# Patient Record
Sex: FEMALE | Race: WHITE | Employment: OTHER | URBAN - NONMETROPOLITAN AREA
[De-identification: names, ages, dates, MRNs, and addresses within clinical notes are randomized per-mention and may not be internally consistent; named-entity substitution may affect disease eponyms.]

---

## 2022-04-13 ENCOUNTER — TELEPHONE (OUTPATIENT)
Dept: UROLOGY | Age: 87
End: 2022-04-13

## 2022-04-13 RX ORDER — GABAPENTIN 300 MG/1
CAPSULE ORAL
COMMUNITY
Start: 2022-04-11

## 2022-04-13 RX ORDER — NITROFURANTOIN 25; 75 MG/1; MG/1
CAPSULE ORAL
COMMUNITY
Start: 2022-03-28 | End: 2022-06-01

## 2022-04-13 RX ORDER — NITROGLYCERIN 0.4 MG/1
0.4 TABLET SUBLINGUAL EVERY 5 MIN PRN
COMMUNITY

## 2022-04-13 RX ORDER — ESCITALOPRAM OXALATE 5 MG/1
TABLET ORAL
COMMUNITY
Start: 2022-03-26

## 2022-04-13 RX ORDER — MULTIVIT-MIN/IRON/FOLIC ACID/K 18-600-40
CAPSULE ORAL
COMMUNITY
Start: 2022-03-19

## 2022-04-13 RX ORDER — OMEPRAZOLE 20 MG/1
20 CAPSULE, DELAYED RELEASE ORAL DAILY
COMMUNITY

## 2022-04-13 NOTE — TELEPHONE ENCOUNTER
Received referral to [facility] Urology from Dr. Mena Sutton office for recurrent UTI. Attempted to contact patient to schedule, no answer and no voicemail set up.

## 2022-06-01 ENCOUNTER — OFFICE VISIT (OUTPATIENT)
Dept: UROLOGY | Age: 87
End: 2022-06-01
Payer: MEDICARE

## 2022-06-01 VITALS
HEART RATE: 89 BPM | RESPIRATION RATE: 16 BRPM | BODY MASS INDEX: 26.9 KG/M2 | OXYGEN SATURATION: 97 % | HEIGHT: 60 IN | WEIGHT: 137 LBS | SYSTOLIC BLOOD PRESSURE: 136 MMHG | DIASTOLIC BLOOD PRESSURE: 86 MMHG

## 2022-06-01 DIAGNOSIS — N39.0 RECURRENT UTI: Primary | ICD-10-CM

## 2022-06-01 DIAGNOSIS — R33.9 INCOMPLETE EMPTYING OF BLADDER: ICD-10-CM

## 2022-06-01 PROCEDURE — 99203 OFFICE O/P NEW LOW 30 MIN: CPT | Performed by: UROLOGY

## 2022-06-01 PROCEDURE — 1123F ACP DISCUSS/DSCN MKR DOCD: CPT | Performed by: UROLOGY

## 2022-06-01 NOTE — PROGRESS NOTES
Dr. Elton Dixon MD MD  [facility] Clinic Consultation / New Patient Visit    Patient:  Kelly Griffith  YOB: 1928  Date: 6/1/2022  Consult requested from Shauna Butler MD     HISTORY OF PRESENT ILLNESS:   The patient is a 80 y.o. female who presents today for follow-up for the following problem(s): urinary retention  Overall the problem(s) : are worsening. Associated Symptoms: No dysuria, gross hematuria. Pain Severity:      Today visit:   6/1/22  Presents with history of recurrent UTI. Which has been improved since getting a bidet. She has history of difficulty emptying and urinary retention. She has urge incontinence, which is mild. PPD: 0 ml. Summary of old records:   (Patient's old records, notes and chart reviewed and summarized above.)    Urinalysis today:  No results found for this visit on 06/01/22. Last BUN and creatinine:  No results found for: BUN  No results found for: CREATININE    Imaging Reviewed during this Office Visit:   (results were independently reviewed by physician and radiology report verified)    PAST MEDICAL, FAMILY AND SOCIAL HISTORY:  History reviewed. No pertinent past medical history. History reviewed. No pertinent surgical history. History reviewed. No pertinent family history.   Outpatient Medications Marked as Taking for the 6/1/22 encounter (Office Visit) with Trena Clarke MD   Medication Sig Dispense Refill    escitalopram (LEXAPRO) 5 MG tablet TAKE 1 TABLET BY MOUTH EVERY OTHER DAY FOR DYSTHYMIA      Vitamin D, Cholecalciferol, 25 MCG (1000 UT) TABS TAKE 1 TABLET BY MOUTH ONCE DAILY      gabapentin (NEURONTIN) 300 MG capsule       Multiple Vitamins-Minerals (EYE VITAMINS PO) Take 1 capsule by mouth daily      omeprazole (PRILOSEC) 20 MG delayed release capsule Take 20 mg by mouth daily      Acetaminophen (TYLENOL PO) Take 500 mg by mouth 3 times daily as needed (fever, discomfort)      Calcium Carbonate Antacid

## 2022-06-01 NOTE — PATIENT INSTRUCTIONS
If you need to reach the Urologist or Urology Nurses for any health care questions or concerns please call [phone] and ask to speak with the [facility]. The phone line is open from 8a-430p Monday thru Friday. Letter sent

## 2024-01-23 RX ORDER — RIVASTIGMINE TARTRATE 1.5 MG/1
1.5 CAPSULE ORAL 2 TIMES DAILY
COMMUNITY

## 2024-01-23 RX ORDER — BISACODYL 5 MG/1
10 TABLET, DELAYED RELEASE ORAL DAILY PRN
COMMUNITY

## 2024-01-23 RX ORDER — MORPHINE SULFATE 100 MG/5ML
0.25 SOLUTION ORAL
COMMUNITY

## 2024-01-23 RX ORDER — ONDANSETRON 4 MG/1
4 TABLET, FILM COATED ORAL EVERY 4 HOURS PRN
COMMUNITY

## 2024-01-23 RX ORDER — ASPIRIN 81 MG
5 TABLET, DELAYED RELEASE (ENTERIC COATED) ORAL PRN
COMMUNITY

## 2024-01-23 RX ORDER — BISACODYL 10 MG
10 SUPPOSITORY, RECTAL RECTAL DAILY PRN
COMMUNITY

## 2024-01-23 NOTE — PROGRESS NOTES
Received med list from [facility] with pt's current med list. Meds updated in Epic.

## 2024-01-26 ENCOUNTER — OUTSIDE SERVICES (OUTPATIENT)
Dept: FAMILY MEDICINE CLINIC | Age: 89
End: 2024-01-26
Payer: MEDICARE

## 2024-01-26 DIAGNOSIS — E78.5 HYPERLIPIDEMIA, UNSPECIFIED HYPERLIPIDEMIA TYPE: ICD-10-CM

## 2024-01-26 DIAGNOSIS — M40.209 KYPHOSIS, UNSPECIFIED KYPHOSIS TYPE, UNSPECIFIED SPINAL REGION: ICD-10-CM

## 2024-01-26 DIAGNOSIS — F34.1 DYSTHYMIC DISORDER: ICD-10-CM

## 2024-01-26 DIAGNOSIS — Z91.81 AT RISK FOR FALLS: ICD-10-CM

## 2024-01-26 DIAGNOSIS — Z86.69 HISTORY OF POLYNEUROPATHY: ICD-10-CM

## 2024-01-26 DIAGNOSIS — R10.13 DYSPEPSIA: ICD-10-CM

## 2024-01-26 DIAGNOSIS — U07.1 COVID-19: ICD-10-CM

## 2024-01-26 DIAGNOSIS — G95.9 MYELOPATHY (HCC): ICD-10-CM

## 2024-01-26 DIAGNOSIS — Z87.39: ICD-10-CM

## 2024-01-26 DIAGNOSIS — R53.1 GENERALIZED WEAKNESS: ICD-10-CM

## 2024-01-26 DIAGNOSIS — G89.4 CHRONIC PAIN SYNDROME: ICD-10-CM

## 2024-01-26 DIAGNOSIS — M47.9 SPONDYLOSIS: ICD-10-CM

## 2024-01-26 DIAGNOSIS — Z86.39 HISTORY OF OBESITY: ICD-10-CM

## 2024-01-26 DIAGNOSIS — N39.0 URINARY TRACT INFECTION WITHOUT HEMATURIA, SITE UNSPECIFIED: Primary | ICD-10-CM

## 2024-01-26 DIAGNOSIS — M81.0 AGE RELATED OSTEOPOROSIS, UNSPECIFIED PATHOLOGICAL FRACTURE PRESENCE: ICD-10-CM

## 2024-01-26 DIAGNOSIS — Z91.81 HISTORY OF FALL: ICD-10-CM

## 2024-01-26 PROCEDURE — 99305 1ST NF CARE MODERATE MDM 35: CPT | Performed by: FAMILY MEDICINE

## 2024-02-01 LAB
ANION GAP SERPL CALCULATED.3IONS-SCNC: 4.2 MMOL/L (ref 3–11)
BANDED NEUTROPHILS RELATIVE PERCENT: 0 % (ref 0–5)
BASOPHILS %. MANUAL COUNT: 1 % (ref 0–1)
BUN BLDV-MCNC: 18 MG/DL (ref 7–17)
CALCIUM SERPL-MCNC: 9 MG/DL (ref 8.4–10.2)
CHLORIDE BLD-SCNC: 106 MMOL/L (ref 98–120)
CO2: 31 MMOL/L (ref 22–31)
CREAT SERPL-MCNC: 0.7 MG/DL (ref 0.5–1)
EOSINOPHILS % MANUAL COUNT: 1 (ref 0–10)
GFR CALCULATED: > 60
GLUCOSE: 97 MG/DL (ref 65–105)
HCT VFR BLD CALC: 30.6 % (ref 37–47)
HEMOGLOBIN: 9.6 (ref 12–16)
HYPOCHROMIA: ABNORMAL
LYMPHOCYTES % MANUAL COUNT: 21 % (ref 21–51)
MCH RBC QN AUTO: 28.8 PG (ref 28.5–32.5)
MCHC RBC AUTO-ENTMCNC: 31.5 G/DL (ref 32–37)
MCV RBC AUTO: 91.3 FL (ref 80–94)
MONOCYTES RELATIVE PERCENT: 11 % (ref 2–9)
NEUTROPHILS %. MANUAL COUNT: 66 % (ref 42–75)
PDW BLD-RTO: 13.3 % (ref 8.5–15.5)
PLATELET # BLD: 307 THOU/MM3 (ref 130–400)
POTASSIUM SERPL-SCNC: 3.2 MMOL/L (ref 3.6–5)
RBC: 3.35 M/UL (ref 4.2–5.4)
SEDIMENTATION RATE, ERYTHROCYTE: 89 MM/HR (ref 0–20)
SODIUM BLD-SCNC: 138 MMOL/L (ref 135–145)
URIC ACID: 2.9 MG/DL (ref 3.5–8.5)
WBC: 11.7 THOU/ML3 (ref 4.8–10.8)

## 2024-02-05 RX ORDER — DOCUSATE SODIUM 100 MG/1
100 CAPSULE, LIQUID FILLED ORAL DAILY
COMMUNITY

## 2024-02-07 ENCOUNTER — TELEPHONE (OUTPATIENT)
Dept: FAMILY MEDICINE CLINIC | Age: 89
End: 2024-02-07

## 2024-02-07 RX ORDER — ACETAMINOPHEN 650 MG/1
650 SUPPOSITORY RECTAL EVERY 6 HOURS PRN
COMMUNITY

## 2024-02-07 NOTE — TELEPHONE ENCOUNTER
Nurse Suzanne LOPEZ called stating pt - Not really responding except to look at you then closes eyes, temp 100.8 and 101.8, family would like a UA order. BP - 172/73 HR - 94 O2 - 93% Room Air. Pt has an bmp in the morning can you add a UA? Void has been decreased. Pt son said this is how she gets when she has a UTI. Pt not taking Tylenol. Can pt get an order for a suppository for temp control. Will continue put cool rags on pt to help with temp.

## 2024-02-08 LAB
ANION GAP SERPL CALCULATED.3IONS-SCNC: 3.5 MMOL/L (ref 3–11)
BUN BLDV-MCNC: 21 MG/DL (ref 7–17)
CALCIUM SERPL-MCNC: 9 MG/DL (ref 8.4–10.2)
CHLORIDE BLD-SCNC: 105 MMOL/L (ref 98–120)
CO2: 33 MMOL/L (ref 22–31)
CREAT SERPL-MCNC: 0.7 MG/DL (ref 0.5–1)
GFR CALCULATED: > 60
GLUCOSE: 104 MG/DL (ref 65–105)
POTASSIUM SERPL-SCNC: 3.5 MMOL/L (ref 3.6–5)
SODIUM BLD-SCNC: 138 MMOL/L (ref 135–145)

## 2024-02-10 LAB
BACTERIA, URINE: ABNORMAL /HPF
BILIRUBIN URINE: NEGATIVE
BLOOD, URINE: ABNORMAL
CASTS UA: ABNORMAL /LPF
CLARITY: ABNORMAL
COLOR, URINE: YELLOW
CRYSTALS, UA: ABNORMAL
GLUCOSE URINE: NEGATIVE MG/DL
KETONES, URINE: NEGATIVE MG/DL
LEUKOCYTE ESTERASE, URINE: NEGATIVE
NITRITE, URINE: NEGATIVE
PH UA: 8 (ref 5–8.5)
PROTEIN UA: ABNORMAL MG/DL
RBC URINE: ABNORMAL /HPF (ref 0–2)
SPECIFIC GRAVITY, URINE: 1.01 MG/DL (ref 1–1.03)
SQUAMOUS EPITHELIAL: ABNORMAL /HPF
UROBILINOGEN, URINE: 0.2 MG/DL (ref 0.2–1)
WBC URINE: ABNORMAL /HPF (ref 0–4)

## 2024-02-19 NOTE — PROGRESS NOTES
[facility]  Date of Service:  1/26/2024  Sameera Clark  Date of Birth 12/12/1928  MRN: [id number]  Code Status----FULL CODE      HPI:  This is just a former assisted living patient followed by Dr. Washington in [place] who was recently admitted to the [facility] January 7, 2024 through January 10, 2024 for acute weakness and some disorientation. She was briefly housed at [facility] before being admitted to [facility] as they had no available bed at the time of her discharge. She has some underlying dementia and she had acute confusion and weakness, complicated by COVID infection and urinary tract infection at the time. She was hydrated and treated with Keflex for the urinary tract infection. Cognition has improved but she is still not back to her baseline. Seems to have some global weakness. She is working with physical therapy at present. Anticipate she will likely be permanent placement on the nursing home side at this point due to progressing dementia, some global weakness, and advanced age. She had a CT scan of the abdomen during her hospitalization with no acute pathology. Mental status improved with the treatment of the urinary tract infection with Keflex. She is still not back to her baseline as far as overall strength and performance.         Immunization History   Administered Date(s) Administered    COVID-19, MODERNA BLUE border, Primary or Immunocompromised, (age 12y+), IM, 100 mcg/0.5mL 01/19/2021, 02/19/2021, 11/18/2021    DTP 11/17/2011    Hep A, HAVRIX, VAQTA, (age 19y+), IM, 1mL 07/12/2018, 03/11/2019    Influenza Virus Vaccine 10/11/2016, 09/12/2017    Influenza Whole 10/12/2007, 10/14/2008, 08/31/2011    Influenza, FLUBLOK, (age 18 y+), PF, 0.5mL 10/18/2019    Influenza, FLUZONE (age 65 y+), High Dose, 0.7mL 10/02/2021    Influenza, High Dose (Fluzone 65 yrs and older) 10/13/2018, 09/16/2020    Pneumococcal, PCV-13, PREVNAR 13, (age 6w+), IM, 0.5mL

## 2024-02-27 ENCOUNTER — OUTSIDE SERVICES (OUTPATIENT)
Dept: INTERNAL MEDICINE | Age: 89
End: 2024-02-27
Payer: MEDICARE

## 2024-02-27 DIAGNOSIS — F03.B0 MODERATE DEMENTIA, UNSPECIFIED DEMENTIA TYPE, UNSPECIFIED WHETHER BEHAVIORAL, PSYCHOTIC, OR MOOD DISTURBANCE OR ANXIETY (HCC): Primary | ICD-10-CM

## 2024-02-27 DIAGNOSIS — F41.1 GENERALIZED ANXIETY DISORDER: ICD-10-CM

## 2024-02-27 DIAGNOSIS — E78.5 HYPERLIPIDEMIA, UNSPECIFIED HYPERLIPIDEMIA TYPE: ICD-10-CM

## 2024-02-27 DIAGNOSIS — G89.4 CHRONIC PAIN SYNDROME: ICD-10-CM

## 2024-02-27 DIAGNOSIS — M19.011 PRIMARY OSTEOARTHRITIS, RIGHT SHOULDER: ICD-10-CM

## 2024-02-27 DIAGNOSIS — R29.6 RECURRENT FALLS: ICD-10-CM

## 2024-02-27 PROBLEM — R10.13 EPIGASTRIC PAIN: Status: ACTIVE | Noted: 2024-02-27

## 2024-02-27 PROBLEM — Z85.828 PERSONAL HISTORY OF MALIGNANT NEOPLASM OF SKIN: Status: ACTIVE | Noted: 2024-02-27

## 2024-02-27 PROBLEM — M48.061 SPINAL STENOSIS OF LUMBAR REGION: Status: ACTIVE | Noted: 2024-02-27

## 2024-02-27 PROBLEM — E66.9 OBESITY: Status: ACTIVE | Noted: 2024-02-27

## 2024-02-27 PROBLEM — R41.841 COGNITIVE COMMUNICATION DEFICIT: Status: ACTIVE | Noted: 2024-02-27

## 2024-02-27 PROBLEM — N39.0 UTI (URINARY TRACT INFECTION): Status: ACTIVE | Noted: 2024-02-27

## 2024-02-27 PROBLEM — M40.209 KYPHOSIS: Status: ACTIVE | Noted: 2024-02-27

## 2024-02-27 PROBLEM — Z91.81 HISTORY OF FALLING: Status: ACTIVE | Noted: 2024-02-27

## 2024-02-27 PROBLEM — M81.0 OSTEOPOROSIS: Status: ACTIVE | Noted: 2024-02-27

## 2024-02-27 PROBLEM — M13.0 POLYARTHRITIS: Status: ACTIVE | Noted: 2024-02-27

## 2024-02-27 PROBLEM — M41.9 SCOLIOSIS: Status: ACTIVE | Noted: 2024-02-27

## 2024-02-27 PROBLEM — M47.9 SPONDYLOSIS: Status: ACTIVE | Noted: 2024-02-27

## 2024-02-27 PROBLEM — R53.1 GENERALIZED WEAKNESS: Status: ACTIVE | Noted: 2024-02-27

## 2024-02-27 PROBLEM — Z86.16 PERSONAL HISTORY OF COVID-19: Status: ACTIVE | Noted: 2024-02-27

## 2024-02-27 PROBLEM — F34.1 DYSTHYMIC DISORDER: Status: ACTIVE | Noted: 2024-02-27

## 2024-02-27 PROBLEM — R13.10 DYSPHAGIA: Status: ACTIVE | Noted: 2024-02-27

## 2024-02-27 PROBLEM — F03.90 DEMENTIA (HCC): Status: ACTIVE | Noted: 2024-02-27

## 2024-02-27 PROCEDURE — 99308 SBSQ NF CARE LOW MDM 20: CPT | Performed by: NURSE PRACTITIONER

## 2024-02-28 NOTE — PROGRESS NOTES
rivastigmine (EXELON) 1.5 MG capsule Take 1 capsule by mouth 2 times daily      ondansetron (ZOFRAN) 4 MG tablet Take 1 tablet by mouth every 4 hours as needed for Nausea or Vomiting      escitalopram (LEXAPRO) 5 MG tablet Take 2 tablets by mouth daily      Vitamin D, Cholecalciferol, 25 MCG (1000 UT) TABS TAKE 1 TABLET BY MOUTH ONCE DAILY      gabapentin (NEURONTIN) 300 MG capsule Take 1 capsule by mouth 2 times daily.      Multiple Vitamins-Minerals (EYE VITAMINS PO) Take 1 capsule by mouth daily      nitroGLYCERIN (NITROSTAT) 0.4 MG SL tablet Place 1 tablet under the tongue every 5 minutes as needed for Chest pain up to max of 3 total doses. If no relief after 1 dose, call 911.      Acetaminophen (TYLENOL PO) Take 650 mg by mouth every 6 hours as needed (fever, discomfort)       No current facility-administered medications on file prior to visit.       Review of Systems   Reason unable to perform ROS: Little verbal communication.        Physical Exam  Vitals and nursing note reviewed.   Constitutional:       General: She is not in acute distress.     Appearance: She is well-developed. She is not diaphoretic.      Comments: Fatigued, well-groomed   HENT:      Head: Normocephalic and atraumatic.      Right Ear: External ear normal.      Left Ear: External ear normal.   Eyes:      General:         Right eye: No discharge.         Left eye: No discharge.   Neck:      Trachea: No tracheal deviation.   Cardiovascular:      Rate and Rhythm: Normal rate and regular rhythm.      Pulses: Normal pulses.      Heart sounds: Normal heart sounds. No murmur heard.     No friction rub. No gallop.   Pulmonary:      Effort: Pulmonary effort is normal. No respiratory distress.      Breath sounds: Normal breath sounds. No stridor. No wheezing, rhonchi or rales.   Chest:      Chest wall: No tenderness.   Abdominal:      General: Bowel sounds are normal. There is no distension.      Palpations: Abdomen is soft.      Tenderness: There

## 2024-03-28 PROBLEM — N39.0 UTI (URINARY TRACT INFECTION): Status: RESOLVED | Noted: 2024-02-27 | Resolved: 2024-03-28

## 2024-04-23 ENCOUNTER — OUTSIDE SERVICES (OUTPATIENT)
Dept: INTERNAL MEDICINE | Age: 89
End: 2024-04-23
Payer: MEDICARE

## 2024-04-23 DIAGNOSIS — F03.B0 MODERATE DEMENTIA, UNSPECIFIED DEMENTIA TYPE, UNSPECIFIED WHETHER BEHAVIORAL, PSYCHOTIC, OR MOOD DISTURBANCE OR ANXIETY (HCC): Primary | ICD-10-CM

## 2024-04-23 DIAGNOSIS — F41.1 GENERALIZED ANXIETY DISORDER: ICD-10-CM

## 2024-04-23 DIAGNOSIS — E78.5 HYPERLIPIDEMIA, UNSPECIFIED HYPERLIPIDEMIA TYPE: ICD-10-CM

## 2024-04-23 DIAGNOSIS — M13.0 POLYARTHRITIS: ICD-10-CM

## 2024-04-23 DIAGNOSIS — G89.4 CHRONIC PAIN SYNDROME: ICD-10-CM

## 2024-04-23 PROCEDURE — 99308 SBSQ NF CARE LOW MDM 20: CPT | Performed by: NURSE PRACTITIONER

## 2024-04-23 RX ORDER — ESCITALOPRAM OXALATE 10 MG/1
10 TABLET ORAL DAILY
COMMUNITY
Start: 2024-04-01

## 2024-04-23 NOTE — PROGRESS NOTES
[facility]  Date of Service: 04/23/24  Sameera Clark  Date of Birth 12/12/1928  Code Status----DNR CC        HPI:  This is just a former assisted living patient followed by Dr. Washington in [place] who was recently admitted to the [facility] January 7, 2024 through January 10, 2024 for acute weakness and some disorientation. She was briefly housed at [facility] before being admitted to [facility] as they had no available bed at the time of her discharge. She has some underlying dementia and she had acute confusion and weakness, complicated by COVID infection and urinary tract infection at the time. She was hydrated and treated with Keflex for the urinary tract infection. Cognition has improved but she is still not back to her baseline. Seems to have some global weakness. She is working with physical therapy at present. Anticipate she will likely be permanent placement on the nursing home side at this point due to progressing dementia, some global weakness, and advanced age. She had a CT scan of the abdomen during her hospitalization with no acute pathology. Mental status improved with the treatment of the urinary tract infection with Keflex. She is still not back to her baseline as far as overall strength and performance.      In the interim  Patient being seen for routine monthly rounds.  Staff with concerns of intermittent left arm swelling.  Did have ultrasound of the left arm which was negative for DVT.  No acute swelling on exam today.  Patient denies any acute concerns or pain.  Alert to her name only.  She has completed physical therapy and Occupational Therapy.  Has felt to reach max potential.  Vital signs have been stable.  Staff declined any significant behavioral outburst.  Current plan is for patient is long-term care at [facility].  Weight has remained stable.  Continues to follow with psychiatric services.           Immunization History   Administered Date(s)

## 2024-05-31 ENCOUNTER — OUTSIDE SERVICES (OUTPATIENT)
Dept: FAMILY MEDICINE CLINIC | Age: 89
End: 2024-05-31
Payer: MEDICARE

## 2024-05-31 DIAGNOSIS — U07.1 COVID-19: ICD-10-CM

## 2024-05-31 DIAGNOSIS — R10.13 DYSPEPSIA: ICD-10-CM

## 2024-05-31 DIAGNOSIS — Z91.81 AT RISK FOR FALLS: ICD-10-CM

## 2024-05-31 DIAGNOSIS — M40.209 KYPHOSIS, UNSPECIFIED KYPHOSIS TYPE, UNSPECIFIED SPINAL REGION: ICD-10-CM

## 2024-05-31 DIAGNOSIS — R53.1 GENERALIZED WEAKNESS: ICD-10-CM

## 2024-05-31 DIAGNOSIS — Z91.81 HISTORY OF FALL: ICD-10-CM

## 2024-05-31 DIAGNOSIS — G89.4 CHRONIC PAIN SYNDROME: ICD-10-CM

## 2024-05-31 DIAGNOSIS — F34.1 DYSTHYMIC DISORDER: ICD-10-CM

## 2024-05-31 DIAGNOSIS — N39.0 URINARY TRACT INFECTION WITHOUT HEMATURIA, SITE UNSPECIFIED: Primary | ICD-10-CM

## 2024-05-31 DIAGNOSIS — Z87.39: ICD-10-CM

## 2024-05-31 DIAGNOSIS — E78.5 HYPERLIPIDEMIA, UNSPECIFIED HYPERLIPIDEMIA TYPE: ICD-10-CM

## 2024-05-31 DIAGNOSIS — M47.9 SPONDYLOSIS: ICD-10-CM

## 2024-05-31 DIAGNOSIS — M81.0 AGE RELATED OSTEOPOROSIS, UNSPECIFIED PATHOLOGICAL FRACTURE PRESENCE: ICD-10-CM

## 2024-05-31 DIAGNOSIS — Z86.39 HISTORY OF OBESITY: ICD-10-CM

## 2024-05-31 DIAGNOSIS — Z86.69 HISTORY OF POLYNEUROPATHY: ICD-10-CM

## 2024-05-31 DIAGNOSIS — G95.9 MYELOPATHY (HCC): ICD-10-CM

## 2024-05-31 PROCEDURE — 99309 SBSQ NF CARE MODERATE MDM 30: CPT | Performed by: FAMILY MEDICINE

## 2024-06-14 LAB
BACTERIA, URINE: ABNORMAL /HPF
BILIRUBIN, URINE: NEGATIVE
BLOOD, URINE: ABNORMAL
CALCIUM OXALATE CRYSTALS: ABNORMAL /HPF
CASTS UA: ABNORMAL /LPF
CLARITY: ABNORMAL
COLOR: YELLOW
CRYSTALS, UA: PRESENT
GLUCOSE URINE: NEGATIVE MG/DL
KETONES, URINE: NEGATIVE MG/DL
LEUKOCYTE ESTERASE, URINE: NEGATIVE
NITRITE, URINE: NEGATIVE
PH, URINE: 6 (ref 5–8.5)
PROTEIN UA: ABNORMAL MG/DL
RBC URINE: ABNORMAL /HPF (ref 0–2)
SPECIFIC GRAVITY UA: 1.01 MG/DL (ref 1–1.03)
SQUAMOUS EPITHELIAL: ABNORMAL /HPF
UROBILINOGEN, URINE: 0.2 MG/DL (ref 0.2–1)
WBC URINE: ABNORMAL /HPF (ref 0–4)

## 2024-06-19 NOTE — PROGRESS NOTES
[facility]  Date of Service:  5/31/2024  Sameera Clark  Date of Birth 12/12/1928  MRN: [id number]  Code Status----DNR CC        HPI:  This is just a former assisted living patient followed by Dr. Washington in [place] who was recently admitted to the [facility] January 7, 2024 through January 10, 2024 for acute weakness and some disorientation. She was briefly housed at [facility] before being admitted to [facility] as they had no available bed at the time of her discharge. She has some underlying dementia and she had acute confusion and weakness, complicated by COVID infection and urinary tract infection at the time. She was hydrated and treated with Keflex for the urinary tract infection. Cognition has improved but she is still not back to her baseline. Seems to have some global weakness. She is working with physical therapy at present. Anticipate she will likely be permanent placement on the nursing home side at this point due to progressing dementia, some global weakness, and advanced age. She had a CT scan of the abdomen during her hospitalization with no acute pathology. Mental status improved with the treatment of the urinary tract infection with Keflex. She is still not back to her baseline as far as overall strength and performance.      Over the interval:  Patient is being seen for routine monthly rounds at the [facility] care [facility]. Staff with concerns of intermittent left arm swelling.  Did have ultrasound of the left arm which was negative for DVT.  No acute swelling on exam today.  Patient denies any acute concerns or pain.  Alert to her name only.  She has completed physical therapy and Occupational Therapy.  Has felt to reach max potential.  Vital signs have been stable.  Staff declined any significant behavioral outburst.  Current plan is for patient is long-term care at [facility].  Weight has remained stable.  Continues to follow with psychiatric services.

## 2024-06-25 ENCOUNTER — OUTSIDE SERVICES (OUTPATIENT)
Dept: INTERNAL MEDICINE | Age: 89
End: 2024-06-25

## 2024-06-25 VITALS
HEART RATE: 85 BPM | WEIGHT: 137.8 LBS | SYSTOLIC BLOOD PRESSURE: 162 MMHG | TEMPERATURE: 98.7 F | RESPIRATION RATE: 18 BRPM | OXYGEN SATURATION: 93 % | DIASTOLIC BLOOD PRESSURE: 91 MMHG | BODY MASS INDEX: 26.91 KG/M2

## 2024-06-25 DIAGNOSIS — E78.5 HYPERLIPIDEMIA, UNSPECIFIED HYPERLIPIDEMIA TYPE: ICD-10-CM

## 2024-06-25 DIAGNOSIS — Z00.00 ROUTINE GENERAL MEDICAL EXAMINATION AT A HEALTH CARE FACILITY: Primary | ICD-10-CM

## 2024-06-25 DIAGNOSIS — G89.4 CHRONIC PAIN SYNDROME: ICD-10-CM

## 2024-06-25 DIAGNOSIS — M13.0 POLYARTHRITIS: ICD-10-CM

## 2024-06-25 DIAGNOSIS — F41.1 GENERALIZED ANXIETY DISORDER: ICD-10-CM

## 2024-06-25 DIAGNOSIS — F03.B0 MODERATE DEMENTIA, UNSPECIFIED DEMENTIA TYPE, UNSPECIFIED WHETHER BEHAVIORAL, PSYCHOTIC, OR MOOD DISTURBANCE OR ANXIETY (HCC): ICD-10-CM

## 2024-06-25 RX ORDER — TRIAMCINOLONE ACETONIDE 1 MG/G
CREAM TOPICAL
COMMUNITY

## 2024-06-25 ASSESSMENT — LIFESTYLE VARIABLES
HOW OFTEN DO YOU HAVE A DRINK CONTAINING ALCOHOL: NEVER
HOW MANY STANDARD DRINKS CONTAINING ALCOHOL DO YOU HAVE ON A TYPICAL DAY: PATIENT DOES NOT DRINK

## 2024-06-25 ASSESSMENT — PATIENT HEALTH QUESTIONNAIRE - PHQ9: DEPRESSION UNABLE TO ASSESS: FUNCTIONAL CAPACITY MOTIVATION LIMITS ACCURACY

## 2024-06-26 NOTE — PROGRESS NOTES
[facility]  Date of Service: 06/25/24  Sameera Clark  Date of Birth 12/12/1928  Code Status----DNR CC        HPI:  This is just a former assisted living patient followed by Dr. Washington in [place] who was recently admitted to the [facility] January 7, 2024 through January 10, 2024 for acute weakness and some disorientation. She was briefly housed at [facility] before being admitted to [facility] as they had no available bed at the time of her discharge. She has some underlying dementia and she had acute confusion and weakness, complicated by COVID infection and urinary tract infection at the time. She was hydrated and treated with Keflex for the urinary tract infection. Cognition has improved but she is still not back to her baseline. Seems to have some global weakness. She is working with physical therapy at present. Anticipate she will likely be permanent placement on the nursing home side at this point due to progressing dementia, some global weakness, and advanced age. She had a CT scan of the abdomen during her hospitalization with no acute pathology. Mental status improved with the treatment of the urinary tract infection with Keflex. She is still not back to her baseline as far as overall strength and performance.      Over the interval:  Patient is being seen for annual wellness visit, routine monthly rounds at the [facility] care facility, intermittent elevations in blood pressure..  Patient has completed physical therapy.  Has felt to meet max potential.  Will be long-term at [facility].  Following with psych services.  No significant behavioral outburst.  Does continue on her Lexapro and Exelon patch.  Denies any pain.  Awaiting her specialized wheelchair.  They deny any problems with her bowel or bladder at present.  Previously was on the bowel list frequently.  Does require the assistance of the staff for all ADLs.  Intermittent elevations in her blood pressure.  We 
30 mLs by mouth daily as needed for Constipation  Santos Hernandez MD   morphine sulfate 20 MG/ML concentrated oral solution Take 0.25 mLs by mouth every hour as needed for Pain. Max Daily Amount: 120 mg  Santos Hernandez MD   rivastigmine (EXELON) 1.5 MG capsule Take 1 capsule by mouth 2 times daily  Santos Hernandez MD   ondansetron (ZOFRAN) 4 MG tablet Take 1 tablet by mouth every 4 hours as needed for Nausea or Vomiting  Santos Hernandez MD   Vitamin D, Cholecalciferol, 25 MCG (1000 UT) TABS TAKE 1 TABLET BY MOUTH ONCE DAILY  Santos Hernandez MD   gabapentin (NEURONTIN) 300 MG capsule Take 1 capsule by mouth 2 times daily.  Santos Hernandez MD   Multiple Vitamins-Minerals (EYE VITAMINS PO) Take 1 capsule by mouth daily  Santos Hernandez MD   nitroGLYCERIN (NITROSTAT) 0.4 MG SL tablet Place 1 tablet under the tongue every 5 minutes as needed for Chest pain up to max of 3 total doses. If no relief after 1 dose, call 911.  Santos Hernandez MD   Acetaminophen (TYLENOL PO) Take 650 mg by mouth every 6 hours as needed (fever, discomfort)  Santos Hernandez MD       CareTeam (Including outside providers/suppliers regularly involved in providing care):   Patient Care Team:  Michael Live MD as PCP - General (Family Medicine)     Reviewed and updated this visit:

## 2024-07-26 ENCOUNTER — OUTSIDE SERVICES (OUTPATIENT)
Dept: FAMILY MEDICINE CLINIC | Age: 89
End: 2024-07-26

## 2024-07-26 DIAGNOSIS — Z86.39 HISTORY OF OBESITY: ICD-10-CM

## 2024-07-26 DIAGNOSIS — N39.0 URINARY TRACT INFECTION WITHOUT HEMATURIA, SITE UNSPECIFIED: Primary | ICD-10-CM

## 2024-07-26 DIAGNOSIS — G89.4 CHRONIC PAIN SYNDROME: ICD-10-CM

## 2024-07-26 DIAGNOSIS — E78.5 HYPERLIPIDEMIA, UNSPECIFIED HYPERLIPIDEMIA TYPE: ICD-10-CM

## 2024-07-26 DIAGNOSIS — Z87.39: ICD-10-CM

## 2024-07-26 DIAGNOSIS — R53.1 GENERALIZED WEAKNESS: ICD-10-CM

## 2024-07-26 DIAGNOSIS — Z86.69 HISTORY OF POLYNEUROPATHY: ICD-10-CM

## 2024-07-26 DIAGNOSIS — M81.0 AGE RELATED OSTEOPOROSIS, UNSPECIFIED PATHOLOGICAL FRACTURE PRESENCE: ICD-10-CM

## 2024-07-26 DIAGNOSIS — Z91.81 HISTORY OF FALL: ICD-10-CM

## 2024-07-26 DIAGNOSIS — F34.1 DYSTHYMIC DISORDER: ICD-10-CM

## 2024-07-26 DIAGNOSIS — R10.13 DYSPEPSIA: ICD-10-CM

## 2024-07-26 DIAGNOSIS — M40.209 KYPHOSIS, UNSPECIFIED KYPHOSIS TYPE, UNSPECIFIED SPINAL REGION: ICD-10-CM

## 2024-08-20 LAB
ANION GAP SERPL CALCULATED.3IONS-SCNC: 22.9 MMOL/L (ref 3–11)
B-TYPE NATRIURETIC PEPTIDE: 1990 PG/ML (ref 0–100)
BUN BLDV-MCNC: 101 MG/DL (ref 7–17)
CALCIUM SERPL-MCNC: 8.4 MG/DL (ref 8.4–10.2)
CHLORIDE BLD-SCNC: 109 MMOL/L (ref 98–120)
CO2: 22 MMOL/L (ref 22–31)
CREAT SERPL-MCNC: 7 MG/DL (ref 0.5–1)
GFR, ESTIMATED: 5.8
GLUCOSE: 75 MG/DL (ref 65–105)
HCT VFR BLD CALC: 28.2 % (ref 37–47)
HEMOGLOBIN: 8.4 G/DL (ref 12–16)
MCH RBC QN AUTO: 25.5 PG (ref 28.5–32.5)
MCHC RBC AUTO-ENTMCNC: 29.7 G/DL (ref 32–37)
MCV RBC AUTO: 85.9 FL (ref 80–94)
PDW BLD-RTO: 18.2 % (ref 8.5–15.5)
PLATELET # BLD: 324.1 THOU/MM3 (ref 130–400)
POTASSIUM SERPL-SCNC: 5.9 MMOL/L (ref 3.6–5)
RBC # BLD: 3.28 M/UL (ref 4.2–5.4)
SODIUM BLD-SCNC: 148 MMOL/L (ref 135–145)
WBC # BLD: 13.3 THOU/ML3 (ref 4.8–10.8)

## 2024-08-20 NOTE — PROGRESS NOTES
syndrome. Arthritic complaints. Lower back complaints. Currently she is on gabapentin for likely neuropathy related issues.  Chronic dysthymic disorders. She currently on Lexapro 10 mg daily. Affect is somewhat flat, but she does not seem overly depressed. Currently not overly anxious.  History of falls and ongoing fall risk. Patient is currently involved in physical and occupational therapy. She has moved from assisted living to the extended care facility side since her last hospitalization. Anticipate she may be more long term on the extended care facility side at this time due to advancing dementia and weakness with advanced age.  Hyperlipidemia. Currently not pursuing any active treatment.  History of kyphosis, spondylosis, myelopathy, without myelopathy radiculopathy of the lumbar area. Reported spinal stenosis. Currently she is not having any lower extremity complaints of pain or paresthesia.   History obesity.  History of polyarthritis.  Age-related osteoporosis. Patient is on vitamin D. Does not appear to be thinking any other medication for osteoporosis.  History of polyneuropathy. Mostly the lower extremities. She is on gabapentin 300 mg twice daily. Not reporting any painful extremities today.  Generalized weakness. Much more evident after recent hospitalization for urinary tract infection and COVID. She has met max potential with PT and OT. Somewhat slow to respond but is participating in activities. Will continue same.  Nitroglycerin on her medication list. On current medical records available, I do not find any mention of coronary artery disease or angina.  Chronic need for specialized wheelchair. Patient has peripheral neuropathy, chronic pain, history of kyphosis, and history of spinal stenosis. She is mostly wheelchair bound and depending on wheelchairs for longer distance ambulation. Patient has poor trunk muscular control overall per nursing and physical therapy. She is at risk for slumping over